# Patient Record
Sex: FEMALE | Race: BLACK OR AFRICAN AMERICAN | NOT HISPANIC OR LATINO | URBAN - METROPOLITAN AREA
[De-identification: names, ages, dates, MRNs, and addresses within clinical notes are randomized per-mention and may not be internally consistent; named-entity substitution may affect disease eponyms.]

---

## 2017-10-12 ENCOUNTER — EMERGENCY (EMERGENCY)
Facility: HOSPITAL | Age: 57
LOS: 0 days | Discharge: HOME | End: 2017-10-12
Attending: EMERGENCY MEDICINE
Payer: MEDICAID

## 2017-10-12 VITALS
RESPIRATION RATE: 16 BRPM | OXYGEN SATURATION: 100 % | HEART RATE: 104 BPM | TEMPERATURE: 98 F | SYSTOLIC BLOOD PRESSURE: 121 MMHG | HEIGHT: 63 IN | WEIGHT: 212.08 LBS | DIASTOLIC BLOOD PRESSURE: 77 MMHG

## 2017-10-12 VITALS
DIASTOLIC BLOOD PRESSURE: 74 MMHG | OXYGEN SATURATION: 98 % | TEMPERATURE: 98 F | SYSTOLIC BLOOD PRESSURE: 148 MMHG | RESPIRATION RATE: 17 BRPM | HEART RATE: 72 BPM

## 2017-10-12 PROCEDURE — 93010 ELECTROCARDIOGRAM REPORT: CPT

## 2017-10-12 PROCEDURE — 71010: CPT | Mod: 26

## 2017-10-12 PROCEDURE — 99284 EMERGENCY DEPT VISIT MOD MDM: CPT

## 2017-10-12 RX ORDER — ASPIRIN/CALCIUM CARB/MAGNESIUM 324 MG
1 TABLET ORAL
Qty: 0 | Refills: 0 | COMMUNITY

## 2017-10-12 RX ORDER — CITALOPRAM 10 MG/1
1 TABLET, FILM COATED ORAL
Qty: 0 | Refills: 0 | COMMUNITY

## 2017-10-12 RX ORDER — CHOLECALCIFEROL (VITAMIN D3) 125 MCG
2 CAPSULE ORAL
Qty: 0 | Refills: 0 | COMMUNITY

## 2017-10-12 RX ORDER — METOPROLOL TARTRATE 50 MG
1 TABLET ORAL
Qty: 0 | Refills: 0 | COMMUNITY

## 2017-10-12 RX ORDER — DIPHENHYDRAMINE HCL 50 MG
0 CAPSULE ORAL
Qty: 0 | Refills: 0 | COMMUNITY

## 2017-10-12 RX ORDER — LORATADINE 10 MG/1
1 TABLET ORAL
Qty: 0 | Refills: 0 | COMMUNITY

## 2017-10-12 RX ORDER — CYCLOSPORINE 100 MG/1
4 CAPSULE ORAL
Qty: 0 | Refills: 0 | COMMUNITY

## 2017-10-12 RX ORDER — BUPROPION HYDROCHLORIDE 150 MG/1
1 TABLET, EXTENDED RELEASE ORAL
Qty: 0 | Refills: 0 | COMMUNITY

## 2017-10-12 RX ORDER — ENOXAPARIN SODIUM 100 MG/ML
15 INJECTION SUBCUTANEOUS
Qty: 0 | Refills: 0 | COMMUNITY

## 2017-10-12 RX ORDER — OMEPRAZOLE 10 MG/1
1 CAPSULE, DELAYED RELEASE ORAL
Qty: 0 | Refills: 0 | COMMUNITY

## 2017-10-12 RX ORDER — FLUTICASONE PROPIONATE 220 MCG
1 AEROSOL WITH ADAPTER (GRAM) INHALATION
Qty: 0 | Refills: 0 | COMMUNITY

## 2017-10-12 RX ORDER — INSULIN ASPART 100 [IU]/ML
0 INJECTION, SOLUTION SUBCUTANEOUS
Qty: 0 | Refills: 0 | COMMUNITY

## 2017-10-12 RX ORDER — NIFEDIPINE 30 MG
1 TABLET, EXTENDED RELEASE 24 HR ORAL
Qty: 0 | Refills: 0 | COMMUNITY

## 2017-10-12 RX ORDER — FUROSEMIDE 40 MG
1 TABLET ORAL
Qty: 0 | Refills: 0 | COMMUNITY

## 2017-10-12 RX ORDER — METFORMIN HYDROCHLORIDE 850 MG/1
1 TABLET ORAL
Qty: 0 | Refills: 0 | COMMUNITY

## 2017-10-12 NOTE — ED PROVIDER NOTE - PHYSICAL EXAMINATION
Gen: Alert, weeping  Head: NC, AT   Eyes: PERRL, EOMI, normal lids/conjunctiva  ENT: normal hearing, patent oropharynx without erythema/exudate, uvula midline  Neck: supple, no tenderness, Trachea midline  Pulm: Bilateral BS, normal resp effort, no wheeze/stridor/retractions  CV: RRR, no M/R/G, 2+ radial and dp pulses bl, no edema  Abd: obese, soft, NT/ND, +BS, no hepatosplenomegaly  Mskel: extremities x4 with normal ROM and no joint effusions. no ctl spine ttp.   Skin: no rash, no bruising   Neuro: AAOx3, no sensory/motor deficits, CN 2-12 intact

## 2017-10-12 NOTE — ED ADULT TRIAGE NOTE - LOCATION:
"Pt informed urine sample needed. Pt stated \"it might be a while\".     Wilma Vasquez RN  09/05/17 1254    "
Access center at bedside.     Wilma Vasquez RN  09/05/17 3191    
Right arm;

## 2017-10-12 NOTE — ED PROVIDER NOTE - OBJECTIVE STATEMENT
Pertinent PMH/PSH/FHx/SHx and Review of Systems contained within:  57F hx of renal transplant, htn, hld, dm on insulin pw sob when she woke up this morning to her unresponsive . ems was called to [place] and he was assessed to be in full arrest. at this point her sob began. she was hysterical, as is reported by ems. she had no symptoms prior to this and was at her baseline health. her cousin, who is present, reports this is what happens when the patient experiences bad news  Fh and Sh not otherwise contributory  ROS otherwise negative

## 2017-10-12 NOTE — ED PROVIDER NOTE - MEDICAL DECISION MAKING DETAILS
patient appears to be in great emotional distress due to her husbands cardiac arrest this morning. this is not likely a manifestation of acute illness but will obtain ekg and chest xray. will allow patient to calm down naturally and then reassess. patient appears to be in great emotional distress due to her husbands cardiac arrest this morning. this is not likely a manifestation of acute illness but will obtain ekg and chest xray. will allow patient to calm down naturally and then reassess. EKG shows RBBB patient appears to be in great emotional distress due to her husbands cardiac arrest this morning. this is not likely a manifestation of acute illness but will obtain ekg and chest xray. will allow patient to calm down naturally and then reassess. EKG shows RBBB - patient does not know if this is new or old but will follow with her pmd. at 1215 patient is emotionally calmed and now her symptoms have resolved. will dc.

## 2017-10-13 DIAGNOSIS — E78.5 HYPERLIPIDEMIA, UNSPECIFIED: ICD-10-CM

## 2017-10-13 DIAGNOSIS — F17.200 NICOTINE DEPENDENCE, UNSPECIFIED, UNCOMPLICATED: ICD-10-CM

## 2017-10-13 DIAGNOSIS — I10 ESSENTIAL (PRIMARY) HYPERTENSION: ICD-10-CM

## 2017-10-13 DIAGNOSIS — F41.9 ANXIETY DISORDER, UNSPECIFIED: ICD-10-CM

## 2017-10-13 DIAGNOSIS — R06.02 SHORTNESS OF BREATH: ICD-10-CM

## 2017-10-13 DIAGNOSIS — I45.10 UNSPECIFIED RIGHT BUNDLE-BRANCH BLOCK: ICD-10-CM

## 2017-10-13 DIAGNOSIS — E11.9 TYPE 2 DIABETES MELLITUS WITHOUT COMPLICATIONS: ICD-10-CM

## 2017-10-13 DIAGNOSIS — Z79.4 LONG TERM (CURRENT) USE OF INSULIN: ICD-10-CM

## 2017-10-15 NOTE — ED ADULT NURSE NOTE - TOBACCO USE
Pt walk in on a wheelchair c/o Pain on Left Ankle s/p Slip and Fall. Swelling Deformed noted denies Hitting her head LOC
Never smoker

## 2017-10-25 ENCOUNTER — APPOINTMENT (OUTPATIENT)
Dept: OPHTHALMOLOGY | Facility: CLINIC | Age: 57
End: 2017-10-25
